# Patient Record
Sex: FEMALE | Race: WHITE | NOT HISPANIC OR LATINO | URBAN - NONMETROPOLITAN AREA
[De-identification: names, ages, dates, MRNs, and addresses within clinical notes are randomized per-mention and may not be internally consistent; named-entity substitution may affect disease eponyms.]

---

## 2019-02-25 ENCOUNTER — IMPORTED ENCOUNTER (OUTPATIENT)
Dept: URBAN - NONMETROPOLITAN AREA CLINIC 1 | Facility: CLINIC | Age: 64
End: 2019-02-25

## 2019-02-25 PROBLEM — H25.13: Noted: 2019-02-25

## 2019-02-25 PROBLEM — H35.413: Noted: 2019-02-25

## 2019-02-25 PROBLEM — H52.03: Noted: 2019-02-25

## 2019-02-25 PROBLEM — H52.4: Noted: 2019-02-25

## 2019-02-25 PROCEDURE — 92014 COMPRE OPH EXAM EST PT 1/>: CPT

## 2019-02-25 PROCEDURE — 92015 DETERMINE REFRACTIVE STATE: CPT

## 2019-02-25 NOTE — PATIENT DISCUSSION
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 Cataract OU-Not yet surgical. -Reviewed symptoms of advancing cataract growth such as glare and halos and decreased vision.-Continue to monitor for now. Pt will notify us if any new symptoms develop. Presbyopia-Discussed diagnosis with patient. Hyperopia-Discussed diagnosis with patient. Updated spec Rx given. Recommend lens that will provide comfort as well as protect safety and health of eyes.

## 2020-03-02 ENCOUNTER — IMPORTED ENCOUNTER (OUTPATIENT)
Dept: URBAN - NONMETROPOLITAN AREA CLINIC 1 | Facility: CLINIC | Age: 65
End: 2020-03-02

## 2020-03-02 PROCEDURE — 99214 OFFICE O/P EST MOD 30 MIN: CPT

## 2020-03-02 PROCEDURE — 92015 DETERMINE REFRACTIVE STATE: CPT

## 2021-03-09 ENCOUNTER — IMPORTED ENCOUNTER (OUTPATIENT)
Dept: URBAN - NONMETROPOLITAN AREA CLINIC 1 | Facility: CLINIC | Age: 66
End: 2021-03-09

## 2021-03-09 PROCEDURE — 92015 DETERMINE REFRACTIVE STATE: CPT

## 2021-03-09 PROCEDURE — 99214 OFFICE O/P EST MOD 30 MIN: CPT

## 2022-04-10 ASSESSMENT — TONOMETRY
OS_IOP_MMHG: 12
OS_IOP_MMHG: 14
OD_IOP_MMHG: 12
OD_IOP_MMHG: 15
OD_IOP_MMHG: 14
OS_IOP_MMHG: 17

## 2022-04-10 ASSESSMENT — VISUAL ACUITY
OD_SC: 20/25+2
OS_SC: 20/20
OD_CC: J1
OS_CC: J1
OS_GLARE: 20/40
OD_SC: 20/20-
OD_SC: 20/20
OD_GLARE: 20/40
OS_SC: 20/20
OS_SC: 20/20-